# Patient Record
Sex: MALE | Race: WHITE | URBAN - METROPOLITAN AREA
[De-identification: names, ages, dates, MRNs, and addresses within clinical notes are randomized per-mention and may not be internally consistent; named-entity substitution may affect disease eponyms.]

---

## 2017-01-26 ENCOUNTER — OFFICE VISIT (OUTPATIENT)
Dept: URGENT CARE | Facility: URGENT CARE | Age: 32
End: 2017-01-26
Payer: COMMERCIAL

## 2017-01-26 VITALS
SYSTOLIC BLOOD PRESSURE: 108 MMHG | HEART RATE: 57 BPM | OXYGEN SATURATION: 97 % | WEIGHT: 165 LBS | TEMPERATURE: 98.2 F | DIASTOLIC BLOOD PRESSURE: 66 MMHG | BODY MASS INDEX: 23.1 KG/M2 | RESPIRATION RATE: 14 BRPM | HEIGHT: 71 IN

## 2017-01-26 DIAGNOSIS — R19.7 DIARRHEA, UNSPECIFIED TYPE: Primary | ICD-10-CM

## 2017-01-26 PROCEDURE — 36415 COLL VENOUS BLD VENIPUNCTURE: CPT | Performed by: PHYSICIAN ASSISTANT

## 2017-01-26 PROCEDURE — 80053 COMPREHEN METABOLIC PANEL: CPT | Performed by: PHYSICIAN ASSISTANT

## 2017-01-26 PROCEDURE — 99202 OFFICE O/P NEW SF 15 MIN: CPT | Performed by: PHYSICIAN ASSISTANT

## 2017-01-26 NOTE — NURSING NOTE
"Chief Complaint   Patient presents with     Urgent Care     Gastrointestinal Problem     stomach bug for 2 weeks, diarrhea. nausea       Initial /66 mmHg  Pulse 57  Temp(Src) 98.2  F (36.8  C) (Oral)  Resp 14  Ht 5' 11\" (1.803 m)  Wt 165 lb (74.844 kg)  BMI 23.02 kg/m2  SpO2 97% Estimated body mass index is 23.02 kg/(m^2) as calculated from the following:    Height as of this encounter: 5' 11\" (1.803 m).    Weight as of this encounter: 165 lb (74.844 kg).  BP completed using cuff size: regular  "

## 2017-01-27 ENCOUNTER — OFFICE VISIT (OUTPATIENT)
Dept: FAMILY MEDICINE | Facility: CLINIC | Age: 32
End: 2017-01-27
Payer: COMMERCIAL

## 2017-01-27 VITALS
HEART RATE: 47 BPM | OXYGEN SATURATION: 98 % | TEMPERATURE: 97.3 F | BODY MASS INDEX: 24.42 KG/M2 | WEIGHT: 175 LBS | SYSTOLIC BLOOD PRESSURE: 87 MMHG | DIASTOLIC BLOOD PRESSURE: 53 MMHG

## 2017-01-27 DIAGNOSIS — R19.7 DIARRHEA, UNSPECIFIED TYPE: Primary | ICD-10-CM

## 2017-01-27 PROCEDURE — 99213 OFFICE O/P EST LOW 20 MIN: CPT | Performed by: PHYSICIAN ASSISTANT

## 2017-01-27 NOTE — MR AVS SNAPSHOT
After Visit Summary   1/27/2017    Rj Abarca    MRN: [id number]           Patient Information     Date Of Birth          1985        Visit Information        Provider Department      1/27/2017 3:50 PM Gi Ann PA-C [facility]        Today's Diagnoses     Diarrhea, unspecified type    -  1       Care Instructions    ASSESSMENT AND PLAN  1. Diarrhea, unspecified type  Push fluids. If you are not improving over the next week, make sure to schedule appointment with GI specialist and colonoscopy.   - GASTROENTEROLOGY ADULT REF CONSULT ONLY    MYCHART SIGNUP FOR E-VISITS AND EASIER COMMUNICATION:  [email or web address]     Zipnosis:  [email or web address].  Sign up for e-visits for common illnesses!     RADIOLOGY:   [facility]:  [phone] to schedule any radiology tests at [facility]: [phone]    Mammograms/colonoscopies:  [phone]    CONSUMER PRICE LINE for estimates of test costs:  [phone]               Follow-ups after your visit        Additional Services     GASTROENTEROLOGY ADULT REF CONSULT ONLY       Preferred Location: [facility] [phone] and [facility] [phone]      Please be aware that coverage of these services is subject to the terms and limitations of your health insurance plan.  Call member services at your health plan with any benefit or coverage questions.  Any procedures must be performed at a [place] facility OR coordinated by your clinic's referral office.    Please bring the following with you to your appointment:    (1) Any X-Rays, CTs or MRIs which have been performed.  Contact the facility where they were done to arrange for  prior to your scheduled appointment.    (2) List of current medications   (3) This referral request   (4) Any documents/labs given to you for this referral                  Who to contact     If you have questions or need follow up information about today's  "clinic visit or your schedule please contact [facility] directly at [phone].  Normal or non-critical lab and imaging results will be communicated to you by MyChart, letter or phone within 4 business days after the clinic has received the results. If you do not hear from us within 7 days, please contact the clinic through Webroothart or phone. If you have a critical or abnormal lab result, we will notify you by phone as soon as possible.  Submit refill requests through [employer] or call your pharmacy and they will forward the refill request to us. Please allow 3 business days for your refill to be completed.          Additional Information About Your Visit        MyChart Information     [employer]de lets you send messages to your doctor, view your test results, renew your prescriptions, schedule appointments and more. To sign up, go to [email or web address] . Click on \"Log in\" on the left side of the screen, which will take you to the Welcome page. Then click on \"Sign up Now\" on the right side of the page.     You will be asked to enter the access code listed below, as well as some personal information. Please follow the directions to create your username and password.     Your access code is: [email or web address]  Expires: 2017  1:02 PM     Your access code will  in 90 days. If you need help or a new code, please call your [place] clinic or [phone].        Care EveryWhere ID     This is your Care EveryWhere ID. This could be used by other organizations to access your [place] medical records  [id number]        Your Vitals Were     Pulse Temperature Pulse Oximetry             47 97.3  F (36.3  C) (Tympanic) 98%          Blood Pressure from Last 3 Encounters:   17 87/53   17 108/66    Weight from Last 3 Encounters:   17 175 lb (79.379 kg)   17 165 lb (74.844 kg)              We Performed the Following     GASTROENTEROLOGY ADULT REF CONSULT ONLY        Primary Care Provider "    None Specified       No primary provider on file.        Thank you!     Thank you for choosing [facility]  for your care. Our goal is always to provide you with excellent care. Hearing back from our patients is one way we can continue to improve our services. Please take a few minutes to complete the written survey that you may receive in the mail after your visit with us. Thank you!             Your Updated Medication List - Protect others around you: Learn how to safely use, store and throw away your medicines at www.disposemymeds.org.      Notice  As of 1/27/2017 11:59 PM    You have not been prescribed any medications.

## 2017-01-27 NOTE — NURSING NOTE
"Chief Complaint   Patient presents with     URI       Initial BP 87/53 mmHg  Pulse 47  Temp(Src) 97.3  F (36.3  C) (Tympanic)  Wt 175 lb (79.379 kg)  SpO2 98% Estimated body mass index is 24.42 kg/(m^2) as calculated from the following:    Height as of 1/26/17: 5' 11\" (1.803 m).    Weight as of this encounter: 175 lb (79.379 kg).  BP completed using cuff size: [place]    "

## 2017-01-27 NOTE — PROGRESS NOTES
"RALPH De La Rosa is a 30 yo male who presents for diarrhea x 2 weeks.  At onset, patient felt Nausea and achy.  He had watery diarrhea for 4-5 days.  It quit for a few days and then resumed.  He then ate only crackers and some fluids and it seemed to improve.  He then ate a normal meal and it began again. Bowel movements are 4-6 times a day.  They are not uncontrollable - he is able to reach the rest room.   Reports abdominal cramps that improve with bowel movements, but then they start up again.  Cramps are in lower abdomen area BL.  They are aggravated by lying down.    Denies current nausea.  He has not vomited.  Denies fever.   Stool today was loose vs watery.    No blood in stool, but did see small bit on toilet paper.  However he has hx of hemorrhoids.    He takes no medications.  No recent antbx use.  No chronic illnesses.    No recent travel.    Reports weight loss of 4-5 pounds since onset.    Has tried and \"immodium - type\" medication for relief.      ROS  See HPI    Physical Exam    Vitals & nursing notes reviewed.  B/P: 108/66, T: 98.2, P: 57, R: 14  Constitutional:  Alert, well nourished, well-developed, NAD  Lungs:  CTA, no wheezes, rhonchi, or rales  CV:  RRR,  no murmur appreciated  Abdomen:  Soft, (+) generalized TTP in LLQ & RLQ.  Mild TTP in inferior RUQ, No HSM, No CVA tenderness, (++) bowel sounds,    ASSESSMENT  1. Diarrhea x 2 weeks  Comment: Afebrile.  Stool workup and CMP pending.  Pending labs - patient took home stool collection kit and will return:   1. Clostridium difficile Toxin B PCR   2.Ova and  Parasite Exam Routine,   3. Enteric Bacteria and Virus Panel by [facility] Stool,   4.  Giardia antigen,   5.  Comprehensive metabolic panel (BMP + Alb, Alk         Phos, ALT, AST, Total. Bili, TP)         Hydration.  BRAT diet.  Start with bland foods in small amounts.  OTC pepto-bismal after has given stool sample.  FU with provider at [facility] tomorrow as scheduled for lab results and further " evaluation /management.

## 2017-01-27 NOTE — PROGRESS NOTES
SUBJECTIVE:                                                    Rj Abarca is a 31 year old male who presents to clinic today for the following health issues:      RESPIRATORY SYMPTOMS      Duration: x 2 weeks ago    Description  myalgias, nausea and diarrhea    Severity: moderate    Accompanying signs and symptoms: None    History (predisposing factors):  none    Precipitating or alleviating factors: None    Therapies tried and outcome:  imodium     Pt states that he hasn't eaten the only thing he has been able to eat is crackers and mashed potatoes, and drinking sprite.        Problem list and histories reviewed & adjusted, as indicated.  Additional history: as documented    There is no problem list on file for this patient.    History reviewed. No pertinent past surgical history.    Social History   Substance Use Topics     Smoking status: Never Smoker      Smokeless tobacco: Never Used     Alcohol Use: Not on file     History reviewed. No pertinent family history.      No current outpatient prescriptions on file.     Allergies   Allergen Reactions     Atropine        ROS:  Constitutional, HEENT, cardiovascular, pulmonary, gi and gu systems are negative, except as otherwise noted.    OBJECTIVE:                                                    BP 87/53 mmHg  Pulse 47  Temp(Src) 97.3  F (36.3  C) (Tympanic)  Wt 175 lb (79.379 kg)  SpO2 98%  Body mass index is 24.42 kg/(m^2).    Diagnostic Test Results:  none      ASSESSMENT/PLAN:                                                    1. Diarrhea, unspecified type  Push fluids. If no improvement over the next week, follow up with GI specialists and colonoscopy.   - GASTROENTEROLOGY ADULT REF CONSULT ONLY    Patient made follow up appointment at  to go over results today. 0/4 results were in to discuss. Re-iterated what was discussed in , but feel as if I cannot bill according to the visit and my services done.     JARROD Landis  [facility]

## 2017-01-28 DIAGNOSIS — R19.7 DIARRHEA, UNSPECIFIED TYPE: ICD-10-CM

## 2017-01-28 LAB
ALBUMIN SERPL-MCNC: 4.4 G/DL (ref 3.4–5)
ALP SERPL-CCNC: 80 U/L (ref 40–150)
ALT SERPL W P-5'-P-CCNC: 46 U/L (ref 0–70)
ANION GAP SERPL CALCULATED.3IONS-SCNC: 12 MMOL/L (ref 3–14)
AST SERPL W P-5'-P-CCNC: 20 U/L (ref 0–45)
BILIRUB SERPL-MCNC: 0.8 MG/DL (ref 0.2–1.3)
BUN SERPL-MCNC: 23 MG/DL (ref 7–30)
CALCIUM SERPL-MCNC: 9 MG/DL (ref 8.5–10.1)
CAMPYLOBACTER GROUP BY NAT: NOT DETECTED
CHLORIDE SERPL-SCNC: 106 MMOL/L (ref 94–109)
CO2 SERPL-SCNC: 25 MMOL/L (ref 20–32)
CREAT SERPL-MCNC: 1.12 MG/DL (ref 0.66–1.25)
ENTERIC PATHOGEN COMMENT: NORMAL
GFR SERPL CREATININE-BSD FRML MDRD: 76 ML/MIN/1.7M2
GLUCOSE SERPL-MCNC: 89 MG/DL (ref 70–99)
NOROVIRUS I AND II BY NAT: NOT DETECTED
POTASSIUM SERPL-SCNC: 4.7 MMOL/L (ref 3.4–5.3)
PROT SERPL-MCNC: 7.3 G/DL (ref 6.8–8.8)
ROTAVIRUS A BY NAT: NOT DETECTED
SALMONELLA SPECIES BY NAT: NOT DETECTED
SHIGA TOXIN 1 GENE BY NAT: NOT DETECTED
SHIGA TOXIN 2 GENE BY NAT: NOT DETECTED
SHIGELLA SP+EIEC IPAH STL QL NAA+PROBE: NOT DETECTED
SODIUM SERPL-SCNC: 143 MMOL/L (ref 133–144)
VIBRIO GROUP BY NAT: NOT DETECTED
YERSINIA ENTEROCOLITICA BY NAT: NOT DETECTED

## 2017-01-28 PROCEDURE — 87177 OVA AND PARASITES SMEARS: CPT | Performed by: PHYSICIAN ASSISTANT

## 2017-01-28 PROCEDURE — 87209 SMEAR COMPLEX STAIN: CPT | Performed by: PHYSICIAN ASSISTANT

## 2017-01-28 PROCEDURE — 87506 IADNA-DNA/RNA PROBE TQ 6-11: CPT | Performed by: PHYSICIAN ASSISTANT

## 2017-01-30 ENCOUNTER — TELEPHONE (OUTPATIENT)
Dept: FAMILY MEDICINE | Facility: CLINIC | Age: 32
End: 2017-01-30

## 2017-01-30 LAB
G LAMBLIA AG STL QL IA: NORMAL
MICRO REPORT STATUS: NORMAL
MICRO REPORT STATUS: NORMAL
O+P STL MICRO: NORMAL
SPECIMEN SOURCE: NORMAL
SPECIMEN SOURCE: NORMAL

## 2017-01-30 NOTE — TELEPHONE ENCOUNTER
Ova and parasite, along with giardia still in process.    CMP and enteric bacteria negative/normal.    SADE Watkins, BSN, RN

## 2017-01-30 NOTE — TELEPHONE ENCOUNTER
Reason for Call:  Request for results:    Name of test or procedure: Lab    Date of test of procedure: 1/28/17    Location of the test or procedure: [place]    OK to leave the result message on voice mail or with a family member? YES    Phone number Patient can be reached at:  Other phone number:  [phone]    Additional comments: Patients wife called for results. No Consent to Communicate on record.  Patients spouse advised that communication needs to be with patient.  Patients wife upset as she told provider that she would be calling.    Call taken on 1/30/2017 at 8:05 AM by Natalie Ardon

## 2017-01-30 NOTE — PATIENT INSTRUCTIONS
ASSESSMENT AND PLAN  1. Diarrhea, unspecified type  Push fluids. If you are not improving over the next week, make sure to schedule appointment with GI specialist and colonoscopy.   - GASTROENTEROLOGY ADULT REF CONSULT ONLY    MYCHART SIGNUP FOR E-VISITS AND EASIER COMMUNICATION:  [email or web address]     Zipnosis:  [email or web address].  Sign up for e-visits for common illnesses!     RADIOLOGY:   [facility]:  [phone] to schedule any radiology tests at [facility]: [phone]    Mammograms/colonoscopies:  [phone]    CONSUMER PRICE LINE for estimates of test costs:  [phone]

## 2019-06-14 ENCOUNTER — ANCILLARY PROCEDURE (OUTPATIENT)
Dept: ULTRASOUND IMAGING | Facility: CLINIC | Age: 34
End: 2019-06-14
Attending: FAMILY MEDICINE
Payer: COMMERCIAL

## 2019-06-14 DIAGNOSIS — R10.30 INGUINAL PAIN, UNSPECIFIED LATERALITY: ICD-10-CM

## 2019-06-22 ENCOUNTER — ANCILLARY PROCEDURE (OUTPATIENT)
Dept: MRI IMAGING | Facility: CLINIC | Age: 34
End: 2019-06-22
Attending: FAMILY MEDICINE
Payer: COMMERCIAL

## 2019-06-22 DIAGNOSIS — S39.81XA SPORTS HERNIA: ICD-10-CM

## 2019-06-28 ENCOUNTER — TRANSFERRED RECORDS (OUTPATIENT)
Dept: HEALTH INFORMATION MANAGEMENT | Facility: CLINIC | Age: 34
End: 2019-06-28

## 2019-06-28 ENCOUNTER — MEDICAL CORRESPONDENCE (OUTPATIENT)
Dept: HEALTH INFORMATION MANAGEMENT | Facility: CLINIC | Age: 34
End: 2019-06-28

## 2019-07-08 ENCOUNTER — MEDICAL CORRESPONDENCE (OUTPATIENT)
Dept: HEALTH INFORMATION MANAGEMENT | Facility: CLINIC | Age: 34
End: 2019-07-08

## 2019-08-01 ENCOUNTER — TRANSFERRED RECORDS (OUTPATIENT)
Dept: HEALTH INFORMATION MANAGEMENT | Facility: CLINIC | Age: 34
End: 2019-08-01

## 2019-12-10 NOTE — TELEPHONE ENCOUNTER
DIAGNOSIS: R: Conditions - Leg - Leg Muscle Tightness/Strain, Previous Provider:Ernesto Haskins,Clinic:[facility],, Seen provider 3 years:No,Previous surgery:No,[facility]   APPOINTMENT DATE: 12.20.19   NOTES STATUS DETAILS   OFFICE NOTE from referring provider N/A    OFFICE NOTE from other specialist recieved [place]   DISCHARGE SUMMARY from hospital N/A    DISCHARGE REPORT from the ER N/A    OPERATIVE REPORT N/A    MEDICATION LIST N/A    IMPLANT RECORD/STICKER N/A    LABS     CBC/DIFF N/A    CULTURES N/A    INJECTIONS DONE IN RADIOLOGY N/A    MRI N/A    CT SCAN N/A    XRAYS (IMAGES & REPORTS) N/A    TUMOR     PATHOLOGY  Slides & report N/A      12.10.19 MJ 12:01 PM   Notes don't clearly state if pt was seen with Dr. Haskins for this diagnoses. Called pt to see where he has been seen, if anywhere. LVM.    12.10.19 MJ 2:26 PM  Pt called back. Was seen at [facility]. Sent request to [facility]

## 2019-12-20 ENCOUNTER — OFFICE VISIT (OUTPATIENT)
Dept: ORTHOPEDICS | Facility: CLINIC | Age: 34
End: 2019-12-20
Payer: COMMERCIAL

## 2019-12-20 ENCOUNTER — PRE VISIT (OUTPATIENT)
Dept: ORTHOPEDICS | Facility: CLINIC | Age: 34
End: 2019-12-20

## 2019-12-20 VITALS
HEIGHT: 70 IN | BODY MASS INDEX: 25.6 KG/M2 | SYSTOLIC BLOOD PRESSURE: 122 MMHG | WEIGHT: 178.8 LBS | DIASTOLIC BLOOD PRESSURE: 70 MMHG | HEART RATE: 66 BPM

## 2019-12-20 DIAGNOSIS — S39.81XA SPORTS HERNIA, INITIAL ENCOUNTER: Primary | ICD-10-CM

## 2019-12-20 ASSESSMENT — MIFFLIN-ST. JEOR: SCORE: 1759.77

## 2019-12-20 NOTE — LETTER
Date:December 23, 2019      Patient was self referred, no letter generated. Do not send.        [facility] Information

## 2019-12-20 NOTE — LETTER
12/20/2019      RE: Rj Abarca  [address] [facility] [address]        Subjective:   Rj Abarca is a 34 year old male who presents with right groin pain. The patient reports running an equivalent of marathon every two weeks as well as lifting.  He states that in May 2019 he had onset of right lower abdominal pain which seem to radiate into his right testicle.  This was associated with his workouts and weightlifting.  On June 1 he noted that the pain seemed to start localizing to the pubic symphysis.  He was seen in urgent care and it was suggested he might have a sprain of his oblique musculature.  He rested for about a week.  He then returned to activity and had return of symptoms so was then seen at [facility].  He had a testicular ultrasound which was negative.  He also had an MRI which demonstrated abductor tendinosis as well as some tendinosis of the rectus abdominis insertion and some edema in the pubic symphysis.  The MRI was reviewed by myself directly and is dated 6/22/2019.  He states that since that time he has been involved in physical therapy at [facility].  He notes that he has had improvement.  He is now working out 3 times per week every other day and is not currently having any symptoms.    Background:   Date of injury: None  Duration of symptoms: 6 months  Mechanism of Injury: Chronic; Activity Related   Aggravating factors: At beginning/end of work out- pain in right tescticle  Relieving Factors: Physical therapy, modified activities  Prior Evaluation: [facility]     PAST MEDICAL, SOCIAL, SURGICAL AND FAMILY HISTORY: He  has no past medical history on file.  He  has no past surgical history on file.  His family history is not on file.  He reports that he has never smoked. He has never used smokeless tobacco.    ALLERGIES: He is allergic to atropine.    CURRENT MEDICATIONS: He currently has no medications in their medication list.     REVIEW OF SYSTEMS: 12  "point review of systems is negative except as noted above.     Exam:   /70 (BP Location: Right arm, Patient Position: Sitting, Cuff Size: Adult Large)   Pulse 66   Ht 1.79 m (5' 10.47\")   Wt 81.1 kg (178 lb 12.8 oz)   BMI 25.31 kg/m         CONSTITUTIONAL: healthy, alert and no distress  HEAD: Normocephalic. No masses, lesions, tenderness or abnormalities  SKIN: no suspicious lesions or rashes  GAIT: normal  NEUROLOGIC: Non-focal  PSYCHIATRIC: affect normal/bright and mentation appears normal.    MUSCULOSKELETAL:   Right hip: He has full range of motion in the right hip.  Impingement signs are negative.    Abdomen: There is no evidence of hernia.  He is mildly tender in a very focal area at the insertion of the rectus abdominis on the right.  He is mildly tender over the right pubic tubercle.  He is mildly tender along the abductor longus origin.       Assessment/Plan:   Rj is a 34-year-old male with athletic pubalgia consistent with what we refer to as a sports hernia with a combination of tendinosis of the rectus abdominis insertion as well as the abductor longus origin and involving the pubic tubercle.  He is doing very well with conservative care.  I recommended he continue with this.  We had a lengthy discussion about which activities can be more problematic as he is returning to a more typical exercise program.  All questions were answered.    Chinmay Desai MD    "

## 2019-12-20 NOTE — PROGRESS NOTES
Subjective:   Rj Abarca is a 34 year old male who presents with right groin pain. The patient reports running an equivalent of marathon every two weeks as well as lifting.  He states that in May 2019 he had onset of right lower abdominal pain which seem to radiate into his right testicle.  This was associated with his workouts and weightlifting.  On June 1 he noted that the pain seemed to start localizing to the pubic symphysis.  He was seen in urgent care and it was suggested he might have a sprain of his oblique musculature.  He rested for about a week.  He then returned to activity and had return of symptoms so was then seen at [facility].  He had a testicular ultrasound which was negative.  He also had an MRI which demonstrated abductor tendinosis as well as some tendinosis of the rectus abdominis insertion and some edema in the pubic symphysis.  The MRI was reviewed by myself directly and is dated 6/22/2019.  He states that since that time he has been involved in physical therapy at [facility].  He notes that he has had improvement.  He is now working out 3 times per week every other day and is not currently having any symptoms.    Background:   Date of injury: None  Duration of symptoms: 6 months  Mechanism of Injury: Chronic; Activity Related   Aggravating factors: At beginning/end of work out- pain in right tescticle  Relieving Factors: Physical therapy, modified activities  Prior Evaluation: [facility]     PAST MEDICAL, SOCIAL, SURGICAL AND FAMILY HISTORY: He  has no past medical history on file.  He  has no past surgical history on file.  His family history is not on file.  He reports that he has never smoked. He has never used smokeless tobacco.    ALLERGIES: He is allergic to atropine.    CURRENT MEDICATIONS: He currently has no medications in their medication list.     REVIEW OF SYSTEMS: 12 point review of systems is negative except as noted above.     Exam:   BP  "122/70 (BP Location: Right arm, Patient Position: Sitting, Cuff Size: Adult Large)   Pulse 66   Ht 1.79 m (5' 10.47\")   Wt 81.1 kg (178 lb 12.8 oz)   BMI 25.31 kg/m        CONSTITUTIONAL: healthy, alert and no distress  HEAD: Normocephalic. No masses, lesions, tenderness or abnormalities  SKIN: no suspicious lesions or rashes  GAIT: normal  NEUROLOGIC: Non-focal  PSYCHIATRIC: affect normal/bright and mentation appears normal.    MUSCULOSKELETAL:   Right hip: He has full range of motion in the right hip.  Impingement signs are negative.    Abdomen: There is no evidence of hernia.  He is mildly tender in a very focal area at the insertion of the rectus abdominis on the right.  He is mildly tender over the right pubic tubercle.  He is mildly tender along the abductor longus origin.       Assessment/Plan:   Rj is a 34-year-old male with athletic pubalgia consistent with what we refer to as a sports hernia with a combination of tendinosis of the rectus abdominis insertion as well as the abductor longus origin and involving the pubic tubercle.  He is doing very well with conservative care.  I recommended he continue with this.  We had a lengthy discussion about which activities can be more problematic as he is returning to a more typical exercise program.  All questions were answered.  "

## 2021-04-24 ENCOUNTER — HEALTH MAINTENANCE LETTER (OUTPATIENT)
Age: 36
End: 2021-04-24

## 2021-10-09 ENCOUNTER — HEALTH MAINTENANCE LETTER (OUTPATIENT)
Age: 36
End: 2021-10-09

## 2022-05-16 ENCOUNTER — HEALTH MAINTENANCE LETTER (OUTPATIENT)
Age: 37
End: 2022-05-16

## 2022-09-11 ENCOUNTER — HEALTH MAINTENANCE LETTER (OUTPATIENT)
Age: 37
End: 2022-09-11

## 2023-06-03 ENCOUNTER — HEALTH MAINTENANCE LETTER (OUTPATIENT)
Age: 38
End: 2023-06-03